# Patient Record
(demographics unavailable — no encounter records)

---

## 2025-07-21 NOTE — HISTORY OF PRESENT ILLNESS
[FreeTextEntry1] : 40 RH female no PMH presents with facial pain since childhood.   The pain is on left side of face as if someone is hammering her occurs intermittently once every 2-3 months lasts seconds but also occurs NOT every year.. The pain is severe and "cant even move" until the pain goes away. No numbness. No known triggers,  Not related to dental work Chewing and swallowing no trigger. CT  brain 2014 and 2010  MRI brain   ALL: NKA Tobascco weekly a few cigs; no cannabis; Occ ETOH Single.

## 2025-07-21 NOTE — ASSESSMENT
[FreeTextEntry1] : Chronic atypical facial pain ?? [place] less likely.  Will obtain MRI brain with and without gado with attention to [place].  No treatment

## 2025-07-21 NOTE — PHYSICAL EXAM
[FreeTextEntry1] : Constitutional: No signs of distress or signs of toxicity. Mental Status: Alert and well oriented. Speech fluent. No aphasia. Fund of knowledge intact. Psychiatric: Mood stable Cranial Nerve: PERRLA: No papilledema; No VFC; EOM full. no nystagmus. No Osvaldo. V1-3 intact. No facial asymmetry, hearing grossly intact; palate elevates symmetrically, tongue midline Motor: No involuntary movements noted.  Adequate bulk, tone throughout, 5/5 strength of all muscle groups DTR: present and symmetrical; no clonus, plantars  downgoing Sensory: intact to primary and secondary modalities; neg Romberg Cerebellar: adequate finger to nose and heel to shin bilaterally. Gait: non antalgic or ataxic. Eyes: no redness or swelling HEENT: intact; no signs of trauma. Neck: No masses noted Pulmonary: no respiratory distress Vascular: no temperature, color change or sudomotor changes.; no edema Musculoskeletal: examination of the cervical spine reveals no midline tenderness, range of motion full upon flexion, extension and lateral rotation. Negative facet tenderness, Negative Spurlings bilaterally. examination of the lumbar spine reveals no midline or paraspinal tenderness; Range of motion full upon flexion, extension and lateral rotation; negative facet loading, No tenderness of sciatic notch, No tenderness of bilateral greater trochanters, Negative DOLORES, negative SLRT bilaterally, Skin: No rash.